# Patient Record
Sex: MALE | Race: WHITE | HISPANIC OR LATINO | Employment: PART TIME | URBAN - METROPOLITAN AREA
[De-identification: names, ages, dates, MRNs, and addresses within clinical notes are randomized per-mention and may not be internally consistent; named-entity substitution may affect disease eponyms.]

---

## 2017-12-18 ENCOUNTER — HOSPITAL ENCOUNTER (EMERGENCY)
Facility: MEDICAL CENTER | Age: 27
End: 2017-12-18
Attending: EMERGENCY MEDICINE

## 2017-12-18 VITALS
TEMPERATURE: 97.7 F | DIASTOLIC BLOOD PRESSURE: 62 MMHG | BODY MASS INDEX: 41.83 KG/M2 | HEART RATE: 72 BPM | WEIGHT: 276.02 LBS | HEIGHT: 68 IN | SYSTOLIC BLOOD PRESSURE: 121 MMHG | OXYGEN SATURATION: 95 % | RESPIRATION RATE: 16 BRPM

## 2017-12-18 DIAGNOSIS — I97.621 POSTOPERATIVE HEMATOMA INVOLVING CIRCULATORY SYSTEM FOLLOWING NON-CIRCULATORY SYSTEM PROCEDURE: ICD-10-CM

## 2017-12-18 LAB
ALBUMIN SERPL BCP-MCNC: 2.8 G/DL (ref 3.2–4.9)
ALBUMIN/GLOB SERPL: 1 G/DL
ALP SERPL-CCNC: 54 U/L (ref 30–99)
ALT SERPL-CCNC: 19 U/L (ref 2–50)
ANION GAP SERPL CALC-SCNC: 7 MMOL/L (ref 0–11.9)
AST SERPL-CCNC: 14 U/L (ref 12–45)
BASOPHILS # BLD AUTO: 0.5 % (ref 0–1.8)
BASOPHILS # BLD: 0.06 K/UL (ref 0–0.12)
BILIRUB SERPL-MCNC: 0.4 MG/DL (ref 0.1–1.5)
BUN SERPL-MCNC: 21 MG/DL (ref 8–22)
CALCIUM SERPL-MCNC: 8.2 MG/DL (ref 8.5–10.5)
CHLORIDE SERPL-SCNC: 104 MMOL/L (ref 96–112)
CO2 SERPL-SCNC: 23 MMOL/L (ref 20–33)
CREAT SERPL-MCNC: 1.17 MG/DL (ref 0.5–1.4)
EOSINOPHIL # BLD AUTO: 0.12 K/UL (ref 0–0.51)
EOSINOPHIL NFR BLD: 1 % (ref 0–6.9)
ERYTHROCYTE [DISTWIDTH] IN BLOOD BY AUTOMATED COUNT: 46.1 FL (ref 35.9–50)
GFR SERPL CREATININE-BSD FRML MDRD: >60 ML/MIN/1.73 M 2
GLOBULIN SER CALC-MCNC: 2.8 G/DL (ref 1.9–3.5)
GLUCOSE SERPL-MCNC: 99 MG/DL (ref 65–99)
HCT VFR BLD AUTO: 31.6 % (ref 42–52)
HGB BLD-MCNC: 10.4 G/DL (ref 14–18)
IMM GRANULOCYTES # BLD AUTO: 0.12 K/UL (ref 0–0.11)
IMM GRANULOCYTES NFR BLD AUTO: 1 % (ref 0–0.9)
LYMPHOCYTES # BLD AUTO: 2.3 K/UL (ref 1–4.8)
LYMPHOCYTES NFR BLD: 18.6 % (ref 22–41)
MCH RBC QN AUTO: 28.2 PG (ref 27–33)
MCHC RBC AUTO-ENTMCNC: 32.9 G/DL (ref 33.7–35.3)
MCV RBC AUTO: 85.6 FL (ref 81.4–97.8)
MONOCYTES # BLD AUTO: 1.16 K/UL (ref 0–0.85)
MONOCYTES NFR BLD AUTO: 9.4 % (ref 0–13.4)
NEUTROPHILS # BLD AUTO: 8.59 K/UL (ref 1.82–7.42)
NEUTROPHILS NFR BLD: 69.5 % (ref 44–72)
NRBC # BLD AUTO: 0 K/UL
NRBC BLD AUTO-RTO: 0 /100 WBC
PLATELET # BLD AUTO: 316 K/UL (ref 164–446)
PMV BLD AUTO: 10.3 FL (ref 9–12.9)
POTASSIUM SERPL-SCNC: 4.3 MMOL/L (ref 3.6–5.5)
PROT SERPL-MCNC: 5.6 G/DL (ref 6–8.2)
RBC # BLD AUTO: 3.69 M/UL (ref 4.7–6.1)
SODIUM SERPL-SCNC: 134 MMOL/L (ref 135–145)
WBC # BLD AUTO: 12.4 K/UL (ref 4.8–10.8)

## 2017-12-18 PROCEDURE — 80053 COMPREHEN METABOLIC PANEL: CPT

## 2017-12-18 PROCEDURE — 85025 COMPLETE CBC W/AUTO DIFF WBC: CPT

## 2017-12-18 PROCEDURE — 99285 EMERGENCY DEPT VISIT HI MDM: CPT

## 2017-12-18 PROCEDURE — 700111 HCHG RX REV CODE 636 W/ 250 OVERRIDE (IP): Performed by: EMERGENCY MEDICINE

## 2017-12-18 PROCEDURE — 96375 TX/PRO/DX INJ NEW DRUG ADDON: CPT

## 2017-12-18 PROCEDURE — 96374 THER/PROPH/DIAG INJ IV PUSH: CPT

## 2017-12-18 RX ORDER — MORPHINE SULFATE 4 MG/ML
4 INJECTION, SOLUTION INTRAMUSCULAR; INTRAVENOUS ONCE
Status: COMPLETED | OUTPATIENT
Start: 2017-12-18 | End: 2017-12-18

## 2017-12-18 RX ORDER — ONDANSETRON 4 MG/1
4 TABLET, ORALLY DISINTEGRATING ORAL ONCE
Status: COMPLETED | OUTPATIENT
Start: 2017-12-18 | End: 2017-12-18

## 2017-12-18 RX ORDER — KETOROLAC TROMETHAMINE 30 MG/ML
30 INJECTION, SOLUTION INTRAMUSCULAR; INTRAVENOUS ONCE
Status: COMPLETED | OUTPATIENT
Start: 2017-12-18 | End: 2017-12-18

## 2017-12-18 RX ADMIN — MORPHINE SULFATE 4 MG: 4 INJECTION INTRAVENOUS at 05:21

## 2017-12-18 RX ADMIN — ONDANSETRON 4 MG: 4 TABLET, ORALLY DISINTEGRATING ORAL at 05:21

## 2017-12-18 RX ADMIN — KETOROLAC TROMETHAMINE 30 MG: 30 INJECTION, SOLUTION INTRAMUSCULAR at 05:26

## 2017-12-18 ASSESSMENT — LIFESTYLE VARIABLES
EVER HAD A DRINK FIRST THING IN THE MORNING TO STEADY YOUR NERVES TO GET RID OF A HANGOVER: NO
CONSUMPTION TOTAL: INCOMPLETE
DO YOU DRINK ALCOHOL: YES
TOTAL SCORE: 0
TOTAL SCORE: 0
HAVE PEOPLE ANNOYED YOU BY CRITICIZING YOUR DRINKING: NO
TOTAL SCORE: 0
HAVE YOU EVER FELT YOU SHOULD CUT DOWN ON YOUR DRINKING: NO
EVER FELT BAD OR GUILTY ABOUT YOUR DRINKING: NO

## 2017-12-18 ASSESSMENT — PAIN SCALES - GENERAL
PAINLEVEL_OUTOF10: 5
PAINLEVEL_OUTOF10: 5
PAINLEVEL_OUTOF10: 4

## 2017-12-18 NOTE — ED PROVIDER NOTES
"ED Provider Note    CHIEF COMPLAINT  Chief Complaint   Patient presents with   • Abdominal Swelling     Pt. states he had liposuction two weeks ago in [facility]. Pt. states that his drainage tubes from the surgery fell out two days ago. Pt. states they weren't supposed to be checked until this week for possible removal. Pt. states increasing LLQ swelling. Pt. states 5/10 LLQ and left sided groin pain.         HPI  Floyd Avendaño is a 27 y.o. male who presentsWith abdominal swelling. Patient had a tummy tuck 2 weeks ago in [facility]. He had ADOLFO drains in place. They were supposed to be removed in 3 days at a follow-up appointment. However they came out and he has had increased swelling primarily over the left lateral side of his lower abdominal wound. He denies having a fever. He has throbbing discomfort that is worse when he lifts his leg up or bends over. He has not noticed a skin rash. The wound appears to be healing well.    REVIEW OF SYSTEMS  As per HPI  All other systems are negative.     PAST MEDICAL HISTORY  Past Medical History:   Diagnosis Date   • Anxiety        FAMILY HISTORY  History reviewed. No pertinent family history.    SOCIAL HISTORY  Social History   Substance Use Topics   • Smoking status: Current Every Day Smoker   • Smokeless tobacco: Never Used   • Alcohol use Yes      Comment: occasssioanlly       SURGICAL HISTORY  Tummy tuck    CURRENT MEDICATIONS  Home Medications     Reviewed by Genna Mejia R.N. (Registered Nurse) on 12/18/17 at 6106  Med List Status: Not Addressed   Medication Last Dose Status   lorazepam (ATIVAN) 1 MG TABS  Active                ALLERGIES  Allergies   Allergen Reactions   • Pcn [Penicillins]        PHYSICAL EXAM  VITAL SIGNS: /62   Pulse 74   Temp 36.5 °C (97.7 °F)   Resp 16   Ht 1.727 m (5' 8\")   Wt (!) 125.2 kg (276 lb 0.3 oz)   SpO2 95%   BMI 41.97 kg/m²   Constitutional: Awake and alert, Mildly pale appearing  HENT: Normal inspection  Eyes: " Sclera white  Neck: Normal range of motion  Cardiovascular: Normal heart rate, Normal rhythm  Thorax & Lungs: Normal breath sounds, No respiratory distress, No wheezing, No chest tenderness.   Abdomen:Surgical wound evident horizontally across the lower abdomen. Staples are in place. There is no surrounding redness or warmth. There is no adjacent erythema. No discharge. Circular wound at the umbilicus noted. There does appear to be slight increased fullness over the left lateral side of the wound versus the right. There is no excessive warmth or erythema. He does have diffuse tenderness.  Skin: As described above  Extremities: Intact, symmetric distal pulses, no edema.  Neurologic: Grossly normal    Labs:   Results for orders placed or performed during the hospital encounter of 12/18/17   CBC WITH DIFFERENTIAL   Result Value Ref Range    WBC 12.4 (H) 4.8 - 10.8 K/uL    RBC 3.69 (L) 4.70 - 6.10 M/uL    Hemoglobin 10.4 (L) 14.0 - 18.0 g/dL    Hematocrit 31.6 (L) 42.0 - 52.0 %    MCV 85.6 81.4 - 97.8 fL    MCH 28.2 27.0 - 33.0 pg    MCHC 32.9 (L) 33.7 - 35.3 g/dL    RDW 46.1 35.9 - 50.0 fL    Platelet Count 316 164 - 446 K/uL    MPV 10.3 9.0 - 12.9 fL    Neutrophils-Polys 69.50 44.00 - 72.00 %    Lymphocytes 18.60 (L) 22.00 - 41.00 %    Monocytes 9.40 0.00 - 13.40 %    Eosinophils 1.00 0.00 - 6.90 %    Basophils 0.50 0.00 - 1.80 %    Immature Granulocytes 1.00 (H) 0.00 - 0.90 %    Nucleated RBC 0.00 /100 WBC    Neutrophils (Absolute) 8.59 (H) 1.82 - 7.42 K/uL    Lymphs (Absolute) 2.30 1.00 - 4.80 K/uL    Monos (Absolute) 1.16 (H) 0.00 - 0.85 K/uL    Eos (Absolute) 0.12 0.00 - 0.51 K/uL    Baso (Absolute) 0.06 0.00 - 0.12 K/uL    Immature Granulocytes (abs) 0.12 (H) 0.00 - 0.11 K/uL    NRBC (Absolute) 0.00 K/uL   COMP METABOLIC PANEL   Result Value Ref Range    Sodium 134 (L) 135 - 145 mmol/L    Potassium 4.3 3.6 - 5.5 mmol/L    Chloride 104 96 - 112 mmol/L    Co2 23 20 - 33 mmol/L    Anion Gap 7.0 0.0 - 11.9    Glucose  99 65 - 99 mg/dL    Bun 21 8 - 22 mg/dL    Creatinine 1.17 0.50 - 1.40 mg/dL    Calcium 8.2 (L) 8.5 - 10.5 mg/dL    AST(SGOT) 14 12 - 45 U/L    ALT(SGPT) 19 2 - 50 U/L    Alkaline Phosphatase 54 30 - 99 U/L    Total Bilirubin 0.4 0.1 - 1.5 mg/dL    Albumin 2.8 (L) 3.2 - 4.9 g/dL    Total Protein 5.6 (L) 6.0 - 8.2 g/dL    Globulin 2.8 1.9 - 3.5 g/dL    A-G Ratio 1.0 g/dL   ESTIMATED GFR   Result Value Ref Range    GFR If African American >60 >60 mL/min/1.73 m 2    GFR If Non African American >60 >60 mL/min/1.73 m 2         COURSE & MEDICAL DECISION MAKING  Patient presents with an apparent postoperative hematoma. His wounds appeared to be without infection. He does appear to have fluid collection. I obtained laboratory data. Patient was treated with morphine and Zofran. Pain is controlled. Patient has mild anemia and mildly elevated WBC count of unclear significance. He is afebrile.    Consulted Dr. Christine. Discussed the case. He advised an abdominal binder. This was ordered.     The patient states he can see his surgeon tomorrow. Seems reasonable to allow the surgeon to consider aspiration if felt to be needed. Patient would need further evaluation if he develops any fever, skin rash or other concerning symptoms. He should return for these. He has pain medication at home.    FINAL IMPRESSION  1. Postoperative hematoma        This dictation was created using voice recognition software. The accuracy of the dictation is limited to the abilities of the software.  The nursing notes were reviewed and certain aspects of this information were incorporated into this note.    Electronically signed by: Geovani Castaneda, 12/18/2017 6:15 AM

## 2017-12-18 NOTE — ED NOTES
Assumed care of patient. Patient complains of LLQ abdominal pain with swelling post liposuction two weeks ago in [place]. Patient had drains that fell out two days ago.  Patient alert and oriented x 4. Patient denies any other complaints at this time. Patient side rails up x 2 and call bell within reach.

## 2017-12-18 NOTE — ED NOTES
Pt discharge home. Pt given discharge instructions. Pt verbalized understanding, all questions answered ,vss upon d/c. Pt steady on feet upon discharge

## 2017-12-18 NOTE — ED NOTES
"Floyd Avendaño  27 y.o  Chief Complaint   Patient presents with   • Abdominal Swelling     Pt. states he had liposuction two weeks ago in [facility]. Pt. states that his drainage tubes from the surgery fell out two days ago. Pt. states they weren't supposed to be checked until this week for possible removal. Pt. states increasing LLQ swelling. Pt. states 5/10 LLQ and left sided groin pain.       ./62   Pulse 83   Temp 36.5 °C (97.7 °F)   Resp 16   Ht 1.727 m (5' 8\")   Wt (!) 125.2 kg (276 lb 0.3 oz)   SpO2 98%   BMI 41.97 kg/m²   Pt. States he had a doctor's appt.in [facility] This week but that the pain was too much to bear. Pt. Returned to ER [facility] and educated to inform triage RN of any new or worsening symptoms.  "

## 2017-12-18 NOTE — ED NOTES
Patient's sats decreased to 86% while patient was sleeping and placed on 3 liters via nasal cannula and sats increased to 94%. Patient claims that his pain is better. Patient's results are back and chart up for reevaluation by ERP.

## 2017-12-18 NOTE — ED NOTES
Called patient supply for abdominal binder per charge nurse and they don't have them and traction called and will come place abdominal binder.

## 2017-12-18 NOTE — PROGRESS NOTES
Abdominal binder was delivered to patient.  If any further assistance needed, please call extension [phone] or place order for Ortho Technician assistance as a communication order in [employer].

## 2018-09-15 ENCOUNTER — HOSPITAL ENCOUNTER (EMERGENCY)
Facility: MEDICAL CENTER | Age: 28
End: 2018-09-15
Attending: EMERGENCY MEDICINE

## 2018-09-15 VITALS
RESPIRATION RATE: 16 BRPM | WEIGHT: 282.41 LBS | HEART RATE: 72 BPM | DIASTOLIC BLOOD PRESSURE: 75 MMHG | SYSTOLIC BLOOD PRESSURE: 118 MMHG | TEMPERATURE: 98.4 F | BODY MASS INDEX: 41.83 KG/M2 | HEIGHT: 69 IN | OXYGEN SATURATION: 93 %

## 2018-09-15 DIAGNOSIS — K64.9 HEMORRHOIDS, UNSPECIFIED HEMORRHOID TYPE: ICD-10-CM

## 2018-09-15 PROCEDURE — 99283 EMERGENCY DEPT VISIT LOW MDM: CPT

## 2018-09-15 RX ORDER — HYDROCORTISONE ACETATE 25 MG/1
25 SUPPOSITORY RECTAL EVERY 12 HOURS
Qty: 14 SUPPOSITORY | Refills: 0 | Status: SHIPPED | OUTPATIENT
Start: 2018-09-15 | End: 2018-09-22

## 2018-09-15 ASSESSMENT — PAIN SCALES - GENERAL: PAINLEVEL_OUTOF10: 7

## 2018-09-15 NOTE — ED PROVIDER NOTES
ED Provider Note    CHIEF COMPLAINT  Chief Complaint   Patient presents with   • Rectal Pain     feels like there are bumps   h as rectal bleeding last week        HPI  Floyd Avendaño is a 27 y.o. male who presents complaining of rectal pain and itching for 3 days.  He states that he noticed that he had anal itching and pain the other day when he is intoxicated and felt his bottom.  The pain is dull in nature, increased with wiping his bottom and decreases with an ointment that his aunt gave him.  He states he has had slight bleeding when he wipes his bottom but no blood in the stool.  Denies anal sex or instrumentation.    REVIEW OF SYSTEMS  Pertinent positives include anal pain, blood on the toilet paper  Pertinent negatives include denies family history of pinworms, anal sex or anal instrumentation, hard bowel movement, stool.  PAST MEDICAL HISTORY  Past Medical History:   Diagnosis Date   • Anxiety        FAMILY HISTORY  History reviewed. No pertinent family history.    SOCIAL HISTORY  Social History     Social History   • Marital status: Single     Spouse name: N/A   • Number of children: N/A   • Years of education: N/A     Social History Main Topics   • Smoking status: Current Every Day Smoker   • Smokeless tobacco: Never Used   • Alcohol use Yes      Comment: occasssioanlly   • Drug use: Yes     Types: Inhaled      Comment: Former crystal meth user - Quit 2014   • Sexual activity: Not on file     Other Topics Concern   • Not on file     Social History Narrative    ** Merged History Encounter **            SURGICAL HISTORY  Past Surgical History:   Procedure Laterality Date   • LIPOSUCTION         CURRENT MEDICATIONS  Home Medications     Reviewed by July Castro (Pharmacy Tech) on 09/15/18 at 1401  Med List Status: Complete   Medication Last Dose Status        Patient Michael Taking any Medications                       ALLERGIES  Allergies   Allergen Reactions   • Pcn [Penicillins]        PHYSICAL  "EXAM  VITAL SIGNS: /93   Pulse 65   Temp 36.9 °C (98.4 °F)   Resp 16   Ht 1.753 m (5' 9\")   Wt (!) 128.1 kg (282 lb 6.6 oz)   SpO2 98%   BMI 41.70 kg/m²    Constitutional: Well developed, Well nourished, No acute distress, Non-toxic appearance.   Eyes: PERRLA, EOMI, Conjunctiva normal, No discharge.   Skin: Warm, Dry, No erythema, No rash.   Rectal: The patient has a small hemorrhoid/skin tag at approximately 3:00, there is no thrombosed hemorrhoid, the anus is lathered in a white ointment        COURSE & MEDICAL DECISION MAKING  Pertinent Labs & Imaging studies reviewed. (See chart for details)  This is a 27-year-old male presents with hemorrhoids.  The patient has no evidence of thrombosed hemorrhoid requiring surgical intervention.  He does not appear to have evidence of cancer.  Does have a small skin tag as well as small hemorrhoid.  He is received prescription for Anusol and strict return precautions have been given his to follow-up with GI consultants if he has increasing symptomatology.    New Prescriptions    HYDROCORTISONE (ANUSOL-HC) 25 MG SUPPOS    Insert 1 Suppository in rectum every 12 hours for 7 days.         FINAL IMPRESSION     1. Hemorrhoids, unspecified hemorrhoid type        DISPOSITION:  Patient will be discharged home in stable condition.    FOLLOW UP:  [facility]  [address] [place] [id number] [phone]  Schedule an appointment as soon as possible for a visit in 1 week  As needed      OUTPATIENT MEDICATIONS:  Discharge Medication List as of 9/15/2018  2:18 PM      START taking these medications    Details   hydrocortisone (ANUSOL-HC) 25 MG Suppos Insert 1 Suppository in rectum every 12 hours for 7 days., Disp-14 Suppository, R-0, Print Rx Paper             Electronically signed by: Ketan Child, 9/15/2018 2:16 PM    "

## 2018-09-15 NOTE — ED NOTES
"Denies blood in stool.  Pt states \"I'm so worried.  I felt it there and knew I needed to get to the doctor.\"    "

## 2018-09-15 NOTE — ED NOTES
Ambulatory to room.  Agree with triage assessment.  Changing into gown.  Chart placed for ERP eval.

## 2018-09-15 NOTE — ED NOTES
Pt comes in c/o rectal pain and discomfort that started about 3-4 days ago   Had some bleeding about 9/1/2018 feels a bump there as well

## 2018-09-15 NOTE — ED NOTES
Pt was evaluated by MD and is now cleared for d/c  dischg instructions  Verbally understands  D/c'ed to home in NAD w/ Rx anusol given  Pt will fill and f/u w/ referral MD as needed

## 2018-09-15 NOTE — DISCHARGE INSTRUCTIONS
Hemorrhoids  Hemorrhoids are swollen veins in and around the rectum or anus. There are two types of hemorrhoids:  · Internal hemorrhoids. These occur in the veins that are just inside the rectum. They may poke through to the outside and become irritated and painful.  · External hemorrhoids. These occur in the veins that are outside of the anus and can be felt as a painful swelling or hard lump near the anus.  Most hemorrhoids do not cause serious problems, and they can be managed with home treatments such as diet and lifestyle changes. If home treatments do not help your symptoms, procedures can be done to shrink or remove the hemorrhoids.  What are the causes?  This condition is caused by increased pressure in the anal area. This pressure may result from various things, including:  · Constipation.  · Straining to have a bowel movement.  · Diarrhea.  · Pregnancy.  · Obesity.  · Sitting for long periods of time.  · Heavy lifting or other activity that causes you to strain.  · Anal sex.  What are the signs or symptoms?  Symptoms of this condition include:  · Pain.  · Anal itching or irritation.  · Rectal bleeding.  · Leakage of stool (feces).  · Anal swelling.  · One or more lumps around the anus.  How is this diagnosed?  This condition can often be diagnosed through a visual exam. Other exams or tests may also be done, such as:  · Examination of the rectal area with a gloved hand (digital rectal exam).  · Examination of the anal canal using a small tube (anoscope).  · A blood test, if you have lost a significant amount of blood.  · A test to look inside the colon (sigmoidoscopy or colonoscopy).  How is this treated?  This condition can usually be treated at home. However, various procedures may be done if dietary changes, lifestyle changes, and other home treatments do not help your symptoms. These procedures can help make the hemorrhoids smaller or remove them completely. Some of these procedures involve surgery,  and others do not. Common procedures include:  · Rubber band ligation. Rubber bands are placed at the base of the hemorrhoids to cut off the blood supply to them.  · Sclerotherapy. Medicine is injected into the hemorrhoids to shrink them.  · Infrared coagulation. A type of light energy is used to get rid of the hemorrhoids.  · Hemorrhoidectomy surgery. The hemorrhoids are surgically removed, and the veins that supply them are tied off.  · Stapled hemorrhoidopexy surgery. A circular stapling device is used to remove the hemorrhoids and use staples to cut off the blood supply to them.  Follow these instructions at home:  Eating and drinking  · Eat foods that have a lot of fiber in them, such as whole grains, beans, nuts, fruits, and vegetables. Ask your health care provider about taking products that have added fiber (fiber supplements).  · Drink enough fluid to keep your urine clear or pale yellow.  Managing pain and swelling  · Take warm sitz baths for 20 minutes, 3-4 times a day to ease pain and discomfort.  · If directed, apply ice to the affected area. Using ice packs between sitz baths may be helpful.  ¨ Put ice in a plastic bag.  ¨ Place a towel between your skin and the bag.  ¨ Leave the ice on for 20 minutes, 2-3 times a day.  General instructions  · Take over-the-counter and prescription medicines only as told by your health care provider.  · Use medicated creams or suppositories as told.  · Exercise regularly.  · Go to the bathroom when you have the urge to have a bowel movement. Do not wait.  · Avoid straining to have bowel movements.  · Keep the anal area dry and clean. Use wet toilet paper or moist towelettes after a bowel movement.  · Do not sit on the toilet for long periods of time. This increases blood pooling and pain.  Contact a health care provider if:  · You have increasing pain and swelling that are not controlled by treatment or medicine.  · You have uncontrolled bleeding.  · You have  difficulty having a bowel movement, or you are unable to have a bowel movement.  · You have pain or inflammation outside the area of the hemorrhoids.  This information is not intended to replace advice given to you by your health care provider. Make sure you discuss any questions you have with your health care provider.  Document Released: 12/15/2001 Document Revised: 05/17/2017 Document Reviewed: 08/31/2016  Elsevier Interactive Patient Education © 2017 [employer] Inc[employer]

## 2019-05-10 ENCOUNTER — HOSPITAL ENCOUNTER (EMERGENCY)
Facility: MEDICAL CENTER | Age: 29
End: 2019-05-10
Attending: EMERGENCY MEDICINE

## 2019-05-10 VITALS
DIASTOLIC BLOOD PRESSURE: 66 MMHG | RESPIRATION RATE: 16 BRPM | OXYGEN SATURATION: 97 % | WEIGHT: 228.62 LBS | BODY MASS INDEX: 35.88 KG/M2 | SYSTOLIC BLOOD PRESSURE: 125 MMHG | HEART RATE: 46 BPM | HEIGHT: 67 IN | TEMPERATURE: 97.8 F

## 2019-05-10 DIAGNOSIS — R51.9 ACUTE NONINTRACTABLE HEADACHE, UNSPECIFIED HEADACHE TYPE: ICD-10-CM

## 2019-05-10 PROCEDURE — 700111 HCHG RX REV CODE 636 W/ 250 OVERRIDE (IP): Performed by: EMERGENCY MEDICINE

## 2019-05-10 PROCEDURE — 96372 THER/PROPH/DIAG INJ SC/IM: CPT

## 2019-05-10 PROCEDURE — 99283 EMERGENCY DEPT VISIT LOW MDM: CPT

## 2019-05-10 RX ORDER — KETOROLAC TROMETHAMINE 30 MG/ML
30 INJECTION, SOLUTION INTRAMUSCULAR; INTRAVENOUS ONCE
Status: COMPLETED | OUTPATIENT
Start: 2019-05-10 | End: 2019-05-10

## 2019-05-10 RX ADMIN — KETOROLAC TROMETHAMINE 30 MG: 30 INJECTION, SOLUTION INTRAMUSCULAR at 21:31

## 2019-05-11 NOTE — ED PROVIDER NOTES
ED Provider Note    Scribed for Aditi Osborne M.D. by Arturo Cantor. 5/10/2019  8:46 PM    Means of arrival: Walk In  History of obtained from: Patient  History limited by: None    CHIEF COMPLAINT  Chief Complaint   Patient presents with   • Migraine     Past two days, has been taking excedrine without relief.        LOGAN Avendaño is a 28 y.o. Male with a history of migraines who presents to the Emergency Department for evaluation of an intermittent headache onset 4 days ago. The patient notes that he was sitting at home doing nothing when his headache started. He describes it as feeling like his previous migraines but states that it feels like a severe stabbing pain in the right side of his head, however, he is worried because none of his previous migraines have lasted this long. Per patient, he has tried taking Excedrin for the pain for 3 days which has not provided any alleviation. He states that he regularly drinks 5 hour energy drinks every morning but there has not been a significant change in his caffeine usage, however, he expresses interest in quitting. He denies any recent trauma, alcohol use, and drug use. He also denies experiencing nausea, vomiting, vision changes, numbness, tingling, fever, congestion, coughing, and neck pain.     REVIEW OF SYSTEMS  Pertinent positives include headache. Pertinent negative include no nausea, vomiting, vision changes, numbness, tingling, fever, congestion, coughing, and neck pain.    PAST MEDICAL HISTORY   has a past medical history of Anxiety.    SOCIAL HISTORY  Social History     Social History Main Topics   • Smoking status: Former Smoker   • Smokeless tobacco: Never Used   • Alcohol use No      Comment: occasssioanlly   • Drug use: Yes     Types: Inhaled      Comment: Former crystal meth user - Quit 2014   • Sexual activity: None noted.        SURGICAL HISTORY   has a past surgical history that includes liposuction.    CURRENT MEDICATIONS  Home  "Medications    **Home medications have not yet been reviewed for this encounter**         ALLERGIES  Allergies   Allergen Reactions   • Pcn [Penicillins]        PHYSICAL EXAM  VITAL SIGNS: /74   Pulse (!) 50   Temp 36.2 °C (97.1 °F) (Temporal)   Resp 18   Ht 1.702 m (5' 7\")   Wt 103.7 kg (228 lb 9.9 oz)   SpO2 96%   BMI 35.81 kg/m²      Constitutional: Pleasant well appearing male, reading .  Alert in no apparent distress.  HENT: Normocephalic, Atraumatic. Bilateral external ears normal. Nose normal. Moist mucous membranes.  Neck: Supple, full range of motion.  Eyes: Pupils are equal and reactive. Conjunctiva normal.   Heart: Regular rate and rhythm. No murmurs.    Lungs: No respiratory distress.  Normal work of breathing.  Clear to auscultation bilaterally.  Skin: Warm, Dry. No rash.   Musculoskeletal: Atraumatic, no deformities noted.   Neurologic: Alert and oriented. Moving all extremities spontaneously. Alert and oriented x3.  Cranial nerves II-XII intact.  Strength 5/5 and sensation inact throughout all 4 extremities.  No pronator drift.  No dysmetria.  Normal speech. Normal gait.  Psychiatric: Affect normal, Mood normal. Appears appropriate and not intoxicated.     ED COURSE  Vitals:    05/10/19 1901 05/10/19 1927 05/10/19 2124 05/10/19 2158   BP: 124/74  125/66    Pulse: (!) 50  (!) 48 (!) 46   Resp: 18  16    Temp: 36.2 °C (97.1 °F)  36.6 °C (97.8 °F)    TempSrc: Temporal  Temporal    SpO2: 96%  96% 97%   Weight:  103.7 kg (228 lb 9.9 oz)     Height: 1.702 m (5' 7\")        Medications administered:  Medications   ketorolac (TORADOL) injection 30 mg (30 mg Intramuscular Given 5/10/19 2131)       MEDICAL DECISION MAKING  8:46 PM Patient seen and examined at bedside. The patient presents with a headache.  He is afebrile with normal vitals on arrival and well-appearing.  He has no deficits on exam concerning for intracranial hemorrhage or mass.  History and exam are not concerning for subarachnoid " hemorrhage or meningitis.  Patient will be treated with Toradol 30 mg for his symptoms. I informed the patient that we will give him medications here in the ED for his symptoms and he will be discharged after. I recommended that he taper off of the caffeine if he wants to stop instead of quitting immediately. I also told him to stay as hydrated as possible and get plenty of rest for alleviation of his symptoms. He understood and agreed to the plan of care including discharge.       The patient will return for new or worsening symptoms and is stable at the time of discharge.    The patient is referred to a primary physician for blood pressure management, diabetic screening, and for all other preventative health concerns.    DISPOSITION:  Patient will be discharged home in stable condition.    FOLLOW UP:  [facility]  [address] [address] [phone]  Call   to establish PCP    [facility], Emergency Dept  [address] [address] [phone]    If symptoms worsen      IMPRESSION  (R51) Acute nonintractable headache, unspecified headache type    Results, diagnoses, and treatment options were discussed with the patient and/or family. Patient verbalized understanding of plan of care and strict return precautions prior to discharge.     Arturo PURVIS ([employer]), am scribing for, and in the presence of, Aditi Osborne M.D..    Electronically signed by: Arturo Cantor ([employer]), 5/10/2019    Aditi PURVIS M.D. personally performed the services described in this documentation, as scribed by Arturo Cantor in my presence, and it is both accurate and complete. E.       The note accurately reflects work and decisions made by me.  Aditi Osborne  5/11/2019  12:07 AM

## 2019-05-11 NOTE — ED TRIAGE NOTES
Floyd Avendaño  28 y.o. male  Chief Complaint   Patient presents with   • Migraine     Past two days, has been taking excedrine without relief.      Denies N/V. Denies visual changes. Denies numbness and tingling. Denies any neuro deficits.    Pt amb to triage with steady gait for above complaint.  Pt is alert and oriented, speaking in full sentences, follows commands and responds appropriately to questions. Resp are even and unlabored. No behavioral indicators of pain.   Pt placed in lobby. Pt educated on triage process. Pt encouraged to alert staff for any changes.

## 2019-05-11 NOTE — DISCHARGE INSTRUCTIONS
You were seen in the Emergency Department for headache.      Please use 1,000mg of tylenol or 600mg of ibuprofen every 6 hours as needed for pain.  Drink plenty of fluids.  Slowly decrease caffeine use.     Please follow up with your primary care physician.    Return to the Emergency Department with worsening headache, vision changes, numbness or weakness in extremities, confusion, vomiting, or other concerns.

## 2019-05-31 ENCOUNTER — PATIENT OUTREACH (OUTPATIENT)
Dept: HEALTH INFORMATION MANAGEMENT | Facility: OTHER | Age: 29
End: 2019-05-31

## 2019-05-31 ENCOUNTER — APPOINTMENT (OUTPATIENT)
Dept: RADIOLOGY | Facility: MEDICAL CENTER | Age: 29
End: 2019-05-31
Attending: EMERGENCY MEDICINE

## 2019-05-31 ENCOUNTER — HOSPITAL ENCOUNTER (EMERGENCY)
Facility: MEDICAL CENTER | Age: 29
End: 2019-05-31
Attending: EMERGENCY MEDICINE

## 2019-05-31 VITALS
HEART RATE: 62 BPM | SYSTOLIC BLOOD PRESSURE: 116 MMHG | WEIGHT: 234.79 LBS | BODY MASS INDEX: 36.85 KG/M2 | OXYGEN SATURATION: 97 % | TEMPERATURE: 98 F | DIASTOLIC BLOOD PRESSURE: 74 MMHG | HEIGHT: 67 IN | RESPIRATION RATE: 16 BRPM

## 2019-05-31 DIAGNOSIS — R51.9 ACUTE NONINTRACTABLE HEADACHE, UNSPECIFIED HEADACHE TYPE: ICD-10-CM

## 2019-05-31 PROCEDURE — 700111 HCHG RX REV CODE 636 W/ 250 OVERRIDE (IP): Performed by: EMERGENCY MEDICINE

## 2019-05-31 PROCEDURE — 96372 THER/PROPH/DIAG INJ SC/IM: CPT

## 2019-05-31 PROCEDURE — 99284 EMERGENCY DEPT VISIT MOD MDM: CPT

## 2019-05-31 PROCEDURE — A9270 NON-COVERED ITEM OR SERVICE: HCPCS | Performed by: EMERGENCY MEDICINE

## 2019-05-31 PROCEDURE — 70450 CT HEAD/BRAIN W/O DYE: CPT

## 2019-05-31 PROCEDURE — 700102 HCHG RX REV CODE 250 W/ 637 OVERRIDE(OP): Performed by: EMERGENCY MEDICINE

## 2019-05-31 RX ORDER — CYCLOBENZAPRINE HCL 5 MG
5-10 TABLET ORAL 3 TIMES DAILY PRN
Qty: 30 TAB | Refills: 0 | Status: SHIPPED | OUTPATIENT
Start: 2019-05-31

## 2019-05-31 RX ORDER — BUTALBITAL, ACETAMINOPHEN AND CAFFEINE 50; 325; 40 MG/1; MG/1; MG/1
1 TABLET ORAL EVERY 4 HOURS PRN
Qty: 20 TAB | Refills: 0 | Status: SHIPPED | OUTPATIENT
Start: 2019-05-31 | End: 2019-06-03

## 2019-05-31 RX ORDER — CYCLOBENZAPRINE HCL 5 MG
5-10 TABLET ORAL 3 TIMES DAILY PRN
Qty: 30 TAB | Refills: 0 | Status: SHIPPED | OUTPATIENT
Start: 2019-05-31 | End: 2019-05-31

## 2019-05-31 RX ORDER — CYCLOBENZAPRINE HCL 10 MG
10 TABLET ORAL ONCE
Status: COMPLETED | OUTPATIENT
Start: 2019-05-31 | End: 2019-05-31

## 2019-05-31 RX ORDER — KETOROLAC TROMETHAMINE 30 MG/ML
30 INJECTION, SOLUTION INTRAMUSCULAR; INTRAVENOUS ONCE
Status: COMPLETED | OUTPATIENT
Start: 2019-05-31 | End: 2019-05-31

## 2019-05-31 RX ADMIN — KETOROLAC TROMETHAMINE 30 MG: 30 INJECTION, SOLUTION INTRAMUSCULAR; INTRAVENOUS at 12:37

## 2019-05-31 RX ADMIN — CYCLOBENZAPRINE HYDROCHLORIDE 10 MG: 10 TABLET, FILM COATED ORAL at 12:37

## 2019-05-31 NOTE — ED TRIAGE NOTES
"Floyd Avendaño  Chief Complaint   Patient presents with   • Migraine     left side,  intermittent over last 2 days     Pt ambulatory to triage with above complaint.  VSS,  Pt states intermittent headache sharp pain that \"moves to different places\", but mainly left eye and top of head.  +photosensitivity.   Was seen here approx 2 weeks ago, given a shot and sent home,  But continues to have Headaches.  Pt worried that he may \"have something more serious going on\"  Pt returned to lobby, educated on triage process, and to inform staff of any changes or concerns.     "

## 2019-05-31 NOTE — ED NOTES
Pt discharged home. Explained discharge and medication instructions. Questions and comments addressed. Pt verbalized understanding of instructions. Pt advised to follow-up with PCP as needed or return to ED for any new or worsening of symptoms. Pt is ambulating well and steady on feet. VS stable.

## 2019-05-31 NOTE — ED PROVIDER NOTES
"ED Provider Note    Scribed for Best Arnold M.D. by Mala Foley. 5/31/2019  11:35 AM    Primary care provider: Pcp Pt States None  Means of arrival: Walk-in  History obtained from: Patient  History limited by: None    CHIEF COMPLAINT  Chief Complaint   Patient presents with   • Migraine     left side,  intermittent over last 2 days       HPI  Floyd Avendaño is a 28 y.o. male who presents to the Emergency Department for evaluation of headache pain onset 2 weeks ago. The patient's pain is intermitted and will last for periods of 1-2 hours before resolving. The patient's pain is primarily located on the right side of his head. The patient described his pain as \"sharp and stabbing\". The patient describes the intensity of his pain as \"severe\". The patient has been taking Tylenol, Excedrin, ibuprofen, and an unknown migraine medication for his symptoms with minimal alleviation. The patient affirms an additional symptom of photophobia. The patient's pain is not exacerbated by moving his head or biting down. The patient is unsure what initiates the start of an episode of pain. The patient was seen at this ED two weeks ago with similar symptoms and he reports he was given a shot that alleviated his headache and discharged home. The patient affirms a prior period of similar symptoms that occurred last August. The patient has never been diagnosed with migraine headaches. The patient does not have a regular doctor. The patient denies any fevers, ear pain, neck pain, rhinorrhea, cough, or congestion. No recent trauma to the head.    REVIEW OF SYSTEMS  Pertinent positives include headache and photophobia. Pertinent negatives include no fevers, ear pain, neck pain, rhinorrhea, cough, or congestion.  All other systems reviewed and negative.    PAST MEDICAL HISTORY   has a past medical history of Anxiety.    SURGICAL HISTORY   has a past surgical history that includes liposuction.    SOCIAL HISTORY  Social History " "  Substance Use Topics   • Smoking status: Former Smoker   • Smokeless tobacco: Never Used   • Alcohol use No      Comment: occasssioanlly      History   Drug Use   • Types: Inhaled     Comment: Former crystal meth user - Quit 2014       CURRENT MEDICATIONS  Home Medications     Reviewed by Annmarie Smart R.N. (Registered Nurse) on 05/31/19 at 1112  Med List Status: Complete   Medication Last Dose Status        Patient Michael Taking any Medications                       ALLERGIES  Allergies   Allergen Reactions   • Pcn [Penicillins]        PHYSICAL EXAM  VITAL SIGNS: /66   Pulse (!) 51   Temp 36.8 °C (98.2 °F) (Temporal)   Ht 1.702 m (5' 7\")   Wt 106.5 kg (234 lb 12.6 oz)   SpO2 96%   BMI 36.77 kg/m²     Constitutional: Well developed, Well nourished, mild distress, Non-toxic appearance.   HENT: Normocephalic, Atraumatic, Bilateral external ears normal, Oropharynx moist, No oral exudates. Poor dentition on the right lower jaw. No soft tissue swelling.  TM's clear. No temporal artery tenderness. Slight right TMJ tenderness.  Eyes: PERRLA, EOMI, Conjunctiva normal, No discharge.   Neck: No tenderness, Supple, No stridor. Tenderness on the right paraspinal musculature.   Lymphatic: No lymphadenopathy noted.   Cardiovascular: Normal heart rate, Normal rhythm.   Thorax & Lungs: No respiratory distress  Abdomen: Soft, No tenderness, No masses, No pulsatile masses.   Skin: Warm, Dry, No erythema, No rash.   Extremities:, No edema No cyanosis.   Musculoskeletal: Awake, alert. Cranial nerves 2-11 intact, muscle strength 5/5 in bilateral upper and lower extremities  Neurologic: Awake, alert. Moves all extremities spontaneously.  Psychiatric: Affect normal, Judgment normal, Mood normal.     RADIOLOGY  CT-HEAD W/O   Final Result      No acute intracranial abnormality is identified.        The radiologist's interpretation of all radiological studies have been reviewed by me.      COURSE & MEDICAL DECISION " MAKING  Pertinent Labs & Imaging studies reviewed. (See chart for details)    11:35 AM - Patient seen and examined at bedside. I informed the patient of my plan to CT his head because the patient has never had radiology imaging to evaluate his symptoms. I also informed the patient it is possible he is having a tension headache so I will treat with a muscle relaxer to relieve his paraspinal neck tenderness. The patient noted the Toradol injection he received on his last visit did help his pain so I informed him I will order that for him today. Patient verbalizes understanding and support with my plan of care. Patient will be treated with 30 mg Toradol and 10 mg Flexeril. Ordered CT-Head without to evaluate his symptoms.     1:10 PM - I reevaluated the patient at bedside. The patient informs me they feel improved following medication administration. I discussed the patient's radiology study results which show no acute intracranial abnormality. The patient agrees to establish with a primary care physician. The patient verbalizes they feel comfortable going home. The patient is stable for discharge at this time and will return for any new or worsening symptoms. I discussed plan for discharge and follow up as outlined below. Patient verbalizes understanding and support with my plan for discharge.     Decision Making:  Patient with sharp intermittent headaches on the right side of his head, I do not see any evidence of temporal arthritis, could possibly be some TMJ versus tension headache, do the patient's chronic irregularity with his headaches, does not fit a typical migraine component, multiple visits to the ER I did elect patient has no other     The patient will return for new or worsening symptoms and is stable at the time of discharge.    The patient is referred to a primary physician for blood pressure management, diabetic screening, and for all other preventative health concerns.    DISPOSITION:  Patient will be  discharged home in stable condition.    FOLLOW UP:  [address] [id number] [phone]    Call to schedule an appointment to establish with a primary care provider. This office offers discounts to patients who do not have insurance.     [address] [id number] [phone]          OUTPATIENT MEDICATIONS:  New Prescriptions    ACETAMINOPHEN/CAFFEINE/BUTALBITAL 325-40-50 MG (FIORICET) -40 MG TAB    Take 1 Tab by mouth every four hours as needed for Headache for up to 3 days.    CYCLOBENZAPRINE (FLEXERIL) 5 MG TABLET    Take 1-2 Tabs by mouth 3 times a day as needed.         FINAL IMPRESSION  1. Acute nonintractable headache, unspecified headache type          I, Mala Foley (Scribe), am scribing for, and in the presence of, Best Arnold M.D..    Electronically signed by: Mala Foley (Keyanaibe), 5/31/2019    IBest M.D. personally performed the services described in this documentation, as scribed by Mala Foley in my presence, and it is both accurate and complete.    The note accurately reflects work and decisions made by me.  Best Arnold  5/31/2019  1:28 PM

## 2022-08-14 ENCOUNTER — HOSPITAL ENCOUNTER (EMERGENCY)
Facility: MEDICAL CENTER | Age: 32
End: 2022-08-15
Attending: EMERGENCY MEDICINE

## 2022-08-14 DIAGNOSIS — R10.32 LEFT LOWER QUADRANT ABDOMINAL PAIN: ICD-10-CM

## 2022-08-14 DIAGNOSIS — D72.829 LEUKOCYTOSIS, UNSPECIFIED TYPE: ICD-10-CM

## 2022-08-14 DIAGNOSIS — K57.92 DIVERTICULITIS: Primary | ICD-10-CM

## 2022-08-14 LAB
ALBUMIN SERPL BCP-MCNC: 4.4 G/DL (ref 3.2–4.9)
ALBUMIN/GLOB SERPL: 1.6 G/DL
ALP SERPL-CCNC: 74 U/L (ref 30–99)
ALT SERPL-CCNC: 36 U/L (ref 2–50)
ANION GAP SERPL CALC-SCNC: 13 MMOL/L (ref 7–16)
AST SERPL-CCNC: 28 U/L (ref 12–45)
BASOPHILS # BLD AUTO: 0.6 % (ref 0–1.8)
BASOPHILS # BLD: 0.08 K/UL (ref 0–0.12)
BILIRUB SERPL-MCNC: 0.4 MG/DL (ref 0.1–1.5)
BUN SERPL-MCNC: 21 MG/DL (ref 8–22)
CALCIUM SERPL-MCNC: 9.1 MG/DL (ref 8.5–10.5)
CHLORIDE SERPL-SCNC: 104 MMOL/L (ref 96–112)
CO2 SERPL-SCNC: 21 MMOL/L (ref 20–33)
CREAT SERPL-MCNC: 1 MG/DL (ref 0.5–1.4)
EOSINOPHIL # BLD AUTO: 0.09 K/UL (ref 0–0.51)
EOSINOPHIL NFR BLD: 0.7 % (ref 0–6.9)
ERYTHROCYTE [DISTWIDTH] IN BLOOD BY AUTOMATED COUNT: 44.4 FL (ref 35.9–50)
GFR SERPLBLD CREATININE-BSD FMLA CKD-EPI: 101 ML/MIN/1.73 M 2
GLOBULIN SER CALC-MCNC: 2.8 G/DL (ref 1.9–3.5)
GLUCOSE SERPL-MCNC: 96 MG/DL (ref 65–99)
HCT VFR BLD AUTO: 43.6 % (ref 42–52)
HGB BLD-MCNC: 14.6 G/DL (ref 14–18)
IMM GRANULOCYTES # BLD AUTO: 0.07 K/UL (ref 0–0.11)
IMM GRANULOCYTES NFR BLD AUTO: 0.5 % (ref 0–0.9)
LIPASE SERPL-CCNC: 27 U/L (ref 11–82)
LYMPHOCYTES # BLD AUTO: 4.09 K/UL (ref 1–4.8)
LYMPHOCYTES NFR BLD: 31 % (ref 22–41)
MCH RBC QN AUTO: 27.2 PG (ref 27–33)
MCHC RBC AUTO-ENTMCNC: 33.5 G/DL (ref 33.7–35.3)
MCV RBC AUTO: 81.2 FL (ref 81.4–97.8)
MONOCYTES # BLD AUTO: 0.96 K/UL (ref 0–0.85)
MONOCYTES NFR BLD AUTO: 7.3 % (ref 0–13.4)
NEUTROPHILS # BLD AUTO: 7.91 K/UL (ref 1.82–7.42)
NEUTROPHILS NFR BLD: 59.9 % (ref 44–72)
NRBC # BLD AUTO: 0 K/UL
NRBC BLD-RTO: 0 /100 WBC
PLATELET # BLD AUTO: 241 K/UL (ref 164–446)
PMV BLD AUTO: 10.8 FL (ref 9–12.9)
POTASSIUM SERPL-SCNC: 4.2 MMOL/L (ref 3.6–5.5)
PROT SERPL-MCNC: 7.2 G/DL (ref 6–8.2)
RBC # BLD AUTO: 5.37 M/UL (ref 4.7–6.1)
SODIUM SERPL-SCNC: 138 MMOL/L (ref 135–145)
WBC # BLD AUTO: 13.2 K/UL (ref 4.8–10.8)

## 2022-08-14 PROCEDURE — 80053 COMPREHEN METABOLIC PANEL: CPT

## 2022-08-14 PROCEDURE — 36415 COLL VENOUS BLD VENIPUNCTURE: CPT

## 2022-08-14 PROCEDURE — 99284 EMERGENCY DEPT VISIT MOD MDM: CPT

## 2022-08-14 PROCEDURE — 83690 ASSAY OF LIPASE: CPT

## 2022-08-14 PROCEDURE — 85025 COMPLETE CBC W/AUTO DIFF WBC: CPT

## 2022-08-14 PROCEDURE — 81003 URINALYSIS AUTO W/O SCOPE: CPT

## 2022-08-15 VITALS
HEIGHT: 67 IN | BODY MASS INDEX: 49.1 KG/M2 | WEIGHT: 312.83 LBS | SYSTOLIC BLOOD PRESSURE: 155 MMHG | DIASTOLIC BLOOD PRESSURE: 96 MMHG | RESPIRATION RATE: 16 BRPM | HEART RATE: 85 BPM | OXYGEN SATURATION: 95 % | TEMPERATURE: 98.2 F

## 2022-08-15 LAB
APPEARANCE UR: CLEAR
BILIRUB UR QL STRIP.AUTO: NEGATIVE
COLOR UR: YELLOW
GLUCOSE UR STRIP.AUTO-MCNC: NEGATIVE MG/DL
KETONES UR STRIP.AUTO-MCNC: NEGATIVE MG/DL
LEUKOCYTE ESTERASE UR QL STRIP.AUTO: NEGATIVE
MICRO URNS: NORMAL
NITRITE UR QL STRIP.AUTO: NEGATIVE
PH UR STRIP.AUTO: 5.5 [PH] (ref 5–8)
PROT UR QL STRIP: NEGATIVE MG/DL
RBC UR QL AUTO: NEGATIVE
SP GR UR STRIP.AUTO: 1.03
UROBILINOGEN UR STRIP.AUTO-MCNC: 0.2 MG/DL

## 2022-08-15 RX ORDER — LEVOFLOXACIN 750 MG/1
750 TABLET, FILM COATED ORAL DAILY
Qty: 10 TABLET | Refills: 0 | Status: SHIPPED | OUTPATIENT
Start: 2022-08-15

## 2022-08-15 RX ORDER — IBUPROFEN 800 MG/1
800 TABLET ORAL EVERY 8 HOURS PRN
Qty: 20 TABLET | Refills: 0 | Status: SHIPPED | OUTPATIENT
Start: 2022-08-15 | End: 2022-08-18

## 2022-08-15 RX ORDER — ACETAMINOPHEN 500 MG
1000 TABLET ORAL EVERY 6 HOURS PRN
Qty: 20 TABLET | Refills: 0 | Status: SHIPPED | OUTPATIENT
Start: 2022-08-15 | End: 2022-08-19

## 2022-08-15 NOTE — DISCHARGE INSTRUCTIONS
As discussed your labs are normal other than a slight elevation in your white blood cell count which indicates possible infection.  This could indicate diverticulitis which is inflammation of little small outpouching of your colon.  I have prescribed you an antibiotics and sent to the pharmacy as well as Tylenol and Motrin.  Please take these as directed.  Follow-up with your PCP as needed.  If you have a fever or worsening pain, return to ED for further evaluation treatment.  Thank you for coming in today.

## 2022-08-15 NOTE — ED TRIAGE NOTES
"Chief Complaint   Patient presents with    Abdominal Pain     Pt reports an intermittent tightness to LLQ.        Pt to triage with steady gait for above complaint.     Pt presents in triage reporting a tightness in LLQ that comes and goes over the past week.  Pt states he had a tummy tuck and used to wear a girdle.  Pt has since stopped and is concerned the tightness has something to do with not wearing it. Pt denies n/v/d/c or dysuria.    Pt back to lobby, educated on triage process and encourage to alert staff of any changes.     BP (!) 162/104   Pulse 88   Temp 36.3 °C (97.3 °F) (Temporal)   Resp 20   Ht 1.702 m (5' 7\")   Wt (!) 142 kg (312 lb 13.3 oz)   SpO2 94%   BMI 49.00 kg/m²      "

## 2022-08-15 NOTE — ED NOTES
Discharge education provided. Discharge paperwork signed by pt. Prescription to be picked up by pt. All questions answered. All belongings with pt. Pt ambulated to lobby unassisted with steady gait.

## 2022-08-15 NOTE — ED PROVIDER NOTES
"ED Provider Note    Scribed for Geronimo Maravilla by Jo Murdock. 8/15/2022  12:05 AM    Primary care provider: Pcp Pt States None  Means of arrival: Walk-in  History obtained from: Patient  History limited by: None    CHIEF COMPLAINT  Chief Complaint   Patient presents with    Abdominal Pain     Pt reports an intermittent tightness to LLQ.      HPI  Floyd Avendaño is a 31 y.o. male who presents to the Emergency Department for left-sided abdominal pain onset approximately 1 week ago. He describes his pain as intermittent \"tightening.\" The patient also notes he has worn a girdle for the past two years and ever since he took it off two months ago, he experiences \"cramping\" in his abdomen. Denies fever, nausea, vomiting, diarrhea, cough, shortness of breath, hematuria, dysuria. Denies medication for diabetes or blood pressure.    Quality \"cramping\"  Duration: Intermittent  Severity: Moderate  Associated sx: None    REVIEW OF SYSTEMS  As above, all other systems reviewed and are negative.   See HPI for further details.     PAST MEDICAL HISTORY   has a past medical history of Anxiety.  SURGICAL HISTORY   has a past surgical history that includes liposuction.  SOCIAL HISTORY  Social History     Tobacco Use    Smoking status: Some Days     Types: Cigarettes    Smokeless tobacco: Never   Vaping Use    Vaping Use: Never used   Substance Use Topics    Alcohol use: Yes     Comment: occasssioanlly    Drug use: Yes     Types: Inhaled     Comment: marijuana- daily.  Former crystal meth user - Quit 2014      Social History     Substance and Sexual Activity   Drug Use Yes    Types: Inhaled    Comment: marijuana- daily.  Former crystal meth user - Quit 2014     FAMILY HISTORY  History reviewed. No pertinent family history.  CURRENT MEDICATIONS  Home Medications       Reviewed by Ernestine Brooks R.N. (Registered Nurse) on 08/14/22 at 2218  Med List Status: Not Addressed     Medication Last Dose Status   cyclobenzaprine " "(FLEXERIL) 5 MG tablet  Active                  ALLERGIES  Allergies   Allergen Reactions    Pcn [Penicillins]      Taken recently with no reaction       PHYSICAL EXAM    VITAL SIGNS:   Vitals:    08/14/22 2212 08/15/22 0026   BP: (!) 162/104 (!) 155/96   Pulse: 88 85   Resp: 20 16   Temp: 36.3 °C (97.3 °F) 36.8 °C (98.2 °F)   TempSrc: Temporal Temporal   SpO2: 94% 95%   Weight: (!) 142 kg (312 lb 13.3 oz)    Height: 1.702 m (5' 7\")        Vitals: My interpretation: hypertensive, not tachycardic, afebrile, not hypoxic    Reinterpretation of vitals: Unchanged    PE:   Constitutional: Well developed, Well nourished, No acute distress, Non-toxic appearance.   HENT: Normocephalic, Atraumatic, Bilateral external ears normal, Oropharynx is clear mucous membranes are moist. No oral exudates or nasal discharge.   Eyes: Pupils are equal round and reactive, EOMI, Conjunctiva normal, No discharge.   Neck: Normal range of motion, No tenderness, Supple, No stridor. No meningismus.  Lymphatic: No lymphadenopathy noted.   Cardiovascular: Regular rate and rhythm without murmur rub or gallop.  Thorax & Lungs: Clear breath sounds bilaterally without wheezes, rhonchi or rales. There is no chest wall tenderness.   Abdomen: Soft minimally tender in the left lower quadrant without rebound or guarding, non-distended. There is no rebound or guarding. No organomegaly is appreciated. Bowel sounds are normal.  Skin: Normal without rash.   Back: No CVA or spinal tenderness.   Extremities: Intact distal pulses, No edema, No tenderness, No cyanosis, No clubbing. Capillary refill is less than 2 seconds.  Musculoskeletal: Good range of motion in all major joints. No tenderness to palpation or major deformities noted.   Neurologic: Alert & oriented x 3, Normal motor function, Normal sensory function, No focal deficits noted. Reflexes are normal.  Psychiatric: Affect normal, Judgment normal, Mood normal. There is no suicidal ideation or patient " reported hallucinations.     LABS  Results for orders placed or performed during the hospital encounter of 08/14/22   CBC WITH DIFFERENTIAL   Result Value Ref Range    WBC 13.2 (H) 4.8 - 10.8 K/uL    RBC 5.37 4.70 - 6.10 M/uL    Hemoglobin 14.6 14.0 - 18.0 g/dL    Hematocrit 43.6 42.0 - 52.0 %    MCV 81.2 (L) 81.4 - 97.8 fL    MCH 27.2 27.0 - 33.0 pg    MCHC 33.5 (L) 33.7 - 35.3 g/dL    RDW 44.4 35.9 - 50.0 fL    Platelet Count 241 164 - 446 K/uL    MPV 10.8 9.0 - 12.9 fL    Neutrophils-Polys 59.90 44.00 - 72.00 %    Lymphocytes 31.00 22.00 - 41.00 %    Monocytes 7.30 0.00 - 13.40 %    Eosinophils 0.70 0.00 - 6.90 %    Basophils 0.60 0.00 - 1.80 %    Immature Granulocytes 0.50 0.00 - 0.90 %    Nucleated RBC 0.00 /100 WBC    Neutrophils (Absolute) 7.91 (H) 1.82 - 7.42 K/uL    Lymphs (Absolute) 4.09 1.00 - 4.80 K/uL    Monos (Absolute) 0.96 (H) 0.00 - 0.85 K/uL    Eos (Absolute) 0.09 0.00 - 0.51 K/uL    Baso (Absolute) 0.08 0.00 - 0.12 K/uL    Immature Granulocytes (abs) 0.07 0.00 - 0.11 K/uL    NRBC (Absolute) 0.00 K/uL   COMP METABOLIC PANEL   Result Value Ref Range    Sodium 138 135 - 145 mmol/L    Potassium 4.2 3.6 - 5.5 mmol/L    Chloride 104 96 - 112 mmol/L    Co2 21 20 - 33 mmol/L    Anion Gap 13.0 7.0 - 16.0    Glucose 96 65 - 99 mg/dL    Bun 21 8 - 22 mg/dL    Creatinine 1.00 0.50 - 1.40 mg/dL    Calcium 9.1 8.5 - 10.5 mg/dL    AST(SGOT) 28 12 - 45 U/L    ALT(SGPT) 36 2 - 50 U/L    Alkaline Phosphatase 74 30 - 99 U/L    Total Bilirubin 0.4 0.1 - 1.5 mg/dL    Albumin 4.4 3.2 - 4.9 g/dL    Total Protein 7.2 6.0 - 8.2 g/dL    Globulin 2.8 1.9 - 3.5 g/dL    A-G Ratio 1.6 g/dL   LIPASE   Result Value Ref Range    Lipase 27 11 - 82 U/L   URINALYSIS    Specimen: Urine, Clean Catch; Blood   Result Value Ref Range    Color Yellow     Character Clear     Specific Gravity 1.027 <1.035    Ph 5.5 5.0 - 8.0    Glucose Negative Negative mg/dL    Ketones Negative Negative mg/dL    Protein Negative Negative mg/dL    Bilirubin  Negative Negative    Urobilinogen, Urine 0.2 Negative    Nitrite Negative Negative    Leukocyte Esterase Negative Negative    Occult Blood Negative Negative    Micro Urine Req see below    ESTIMATED GFR   Result Value Ref Range    GFR (CKD-EPI) 101 >60 mL/min/1.73 m 2      All labs reviewed by me. Significant for slight leukocytosis of 13, no anemia, normal electrolytes, normal renal function, normal liver enzymes, normal bilirubin, lipase normal, urinalysis negative for blood, ketones or infection    COURSE & MEDICAL DECISION MAKING  Nursing notes, VS, PMSFHx, labs, imaging, EKG reviewed in chart.    MDM: 12:05 AM Floyd Avendaño is a 31 y.o. male who presented with intermittent left lower quadrant abdominal pain for the past 1 to 2 weeks.  Feels like a cramping sensation.  Denies diarrhea, constipation, dysuria, hematuria, no nausea or vomiting.  He has no associated symptoms, no fever.  His vital signs show mild hypertension but otherwise unremarkable.  Physical exam shows very minimal tenderness in the left lower quadrant without rebound or guarding and he is very well-appearing, ambulatory, tolerating oral intake.  Laboratory evaluation including CBC, CMP, lipase and urinalysis are negative other than a slight leukocytosis of 13.  Possible mild case of diverticulitis, although the patient is very young.  He is allergic to Augmentin so started him on levofloxacin for 10 days, 750 once a day, Tylenol, Motrin and have follow-up with PCP as needed.  Discussed strict return precautions with the patient he verbalized understanding plan and is amenable.  He has not want to stay for imaging referred to leave with an empiric diagnosis of diverticulitis and will return if symptoms worsen.  I discussed risk and benefits of this and he verbalized understanding and is amenable.    Patient has had high blood pressure while in the emergency department, felt likely secondary to medical condition. Counseled patient to monitor  blood pressure at home and follow up with primary care physician.      FINAL IMPRESSION  1. Diverticulitis Acute   2. Left lower quadrant abdominal pain Acute   3. Leukocytosis, unspecified type Acute         Jo PURVIS), am scribing for, and in the presence of, Geronimo Maravilla.    Electronically signed by: Jo Murdock (Jerry), 8/15/2022    IGeronimo personally performed the services described in this documentation, as scribed by Jo Murdock in my presence, and it is both accurate and complete.    The note accurately reflects work and decisions made by me.  Geronimo Maravilla  8/15/2022  12:35 AM

## 2023-11-08 ENCOUNTER — APPOINTMENT (OUTPATIENT)
Dept: RADIOLOGY | Facility: MEDICAL CENTER | Age: 33
End: 2023-11-08
Attending: EMERGENCY MEDICINE

## 2023-11-08 ENCOUNTER — HOSPITAL ENCOUNTER (EMERGENCY)
Facility: MEDICAL CENTER | Age: 33
End: 2023-11-08
Attending: EMERGENCY MEDICINE

## 2023-11-08 VITALS
TEMPERATURE: 97.8 F | DIASTOLIC BLOOD PRESSURE: 59 MMHG | HEART RATE: 56 BPM | RESPIRATION RATE: 16 BRPM | OXYGEN SATURATION: 95 % | SYSTOLIC BLOOD PRESSURE: 117 MMHG

## 2023-11-08 DIAGNOSIS — R10.32 LEFT LOWER QUADRANT ABDOMINAL PAIN: ICD-10-CM

## 2023-11-08 LAB
ALBUMIN SERPL BCP-MCNC: 4.5 G/DL (ref 3.2–4.9)
ALBUMIN/GLOB SERPL: 1.8 G/DL
ALP SERPL-CCNC: 61 U/L (ref 30–99)
ALT SERPL-CCNC: 30 U/L (ref 2–50)
ANION GAP SERPL CALC-SCNC: 9 MMOL/L (ref 7–16)
APPEARANCE UR: CLEAR
AST SERPL-CCNC: 23 U/L (ref 12–45)
BASOPHILS # BLD AUTO: 0.6 % (ref 0–1.8)
BASOPHILS # BLD: 0.05 K/UL (ref 0–0.12)
BILIRUB SERPL-MCNC: 0.3 MG/DL (ref 0.1–1.5)
BILIRUB UR QL STRIP.AUTO: NEGATIVE
BUN SERPL-MCNC: 16 MG/DL (ref 8–22)
CALCIUM ALBUM COR SERPL-MCNC: 8.9 MG/DL (ref 8.5–10.5)
CALCIUM SERPL-MCNC: 9.3 MG/DL (ref 8.4–10.2)
CHLORIDE SERPL-SCNC: 103 MMOL/L (ref 96–112)
CO2 SERPL-SCNC: 27 MMOL/L (ref 20–33)
COLOR UR: YELLOW
CREAT SERPL-MCNC: 0.88 MG/DL (ref 0.5–1.4)
EOSINOPHIL # BLD AUTO: 0.13 K/UL (ref 0–0.51)
EOSINOPHIL NFR BLD: 1.5 % (ref 0–6.9)
ERYTHROCYTE [DISTWIDTH] IN BLOOD BY AUTOMATED COUNT: 43.3 FL (ref 35.9–50)
GFR SERPLBLD CREATININE-BSD FMLA CKD-EPI: 115 ML/MIN/1.73 M 2
GLOBULIN SER CALC-MCNC: 2.5 G/DL (ref 1.9–3.5)
GLUCOSE SERPL-MCNC: 92 MG/DL (ref 65–99)
GLUCOSE UR STRIP.AUTO-MCNC: NEGATIVE MG/DL
HCT VFR BLD AUTO: 43.9 % (ref 42–52)
HGB BLD-MCNC: 14.4 G/DL (ref 14–18)
IMM GRANULOCYTES # BLD AUTO: 0.06 K/UL (ref 0–0.11)
IMM GRANULOCYTES NFR BLD AUTO: 0.7 % (ref 0–0.9)
KETONES UR STRIP.AUTO-MCNC: NEGATIVE MG/DL
LEUKOCYTE ESTERASE UR QL STRIP.AUTO: NEGATIVE
LIPASE SERPL-CCNC: 22 U/L (ref 11–82)
LYMPHOCYTES # BLD AUTO: 2.67 K/UL (ref 1–4.8)
LYMPHOCYTES NFR BLD: 30.1 % (ref 22–41)
MCH RBC QN AUTO: 27.5 PG (ref 27–33)
MCHC RBC AUTO-ENTMCNC: 32.8 G/DL (ref 32.3–36.5)
MCV RBC AUTO: 83.8 FL (ref 81.4–97.8)
MICRO URNS: NORMAL
MONOCYTES # BLD AUTO: 0.61 K/UL (ref 0–0.85)
MONOCYTES NFR BLD AUTO: 6.9 % (ref 0–13.4)
NEUTROPHILS # BLD AUTO: 5.34 K/UL (ref 1.82–7.42)
NEUTROPHILS NFR BLD: 60.2 % (ref 44–72)
NITRITE UR QL STRIP.AUTO: NEGATIVE
NRBC # BLD AUTO: 0 K/UL
NRBC BLD-RTO: 0 /100 WBC (ref 0–0.2)
PH UR STRIP.AUTO: 5.5 [PH] (ref 5–8)
PLATELET # BLD AUTO: 221 K/UL (ref 164–446)
PMV BLD AUTO: 11.2 FL (ref 9–12.9)
POTASSIUM SERPL-SCNC: 4 MMOL/L (ref 3.6–5.5)
PROT SERPL-MCNC: 7 G/DL (ref 6–8.2)
PROT UR QL STRIP: NEGATIVE MG/DL
RBC # BLD AUTO: 5.24 M/UL (ref 4.7–6.1)
RBC UR QL AUTO: NEGATIVE
SODIUM SERPL-SCNC: 139 MMOL/L (ref 135–145)
SP GR UR STRIP.AUTO: >=1.03
WBC # BLD AUTO: 8.9 K/UL (ref 4.8–10.8)

## 2023-11-08 PROCEDURE — 74177 CT ABD & PELVIS W/CONTRAST: CPT

## 2023-11-08 PROCEDURE — 36415 COLL VENOUS BLD VENIPUNCTURE: CPT

## 2023-11-08 PROCEDURE — 94760 N-INVAS EAR/PLS OXIMETRY 1: CPT

## 2023-11-08 PROCEDURE — 83690 ASSAY OF LIPASE: CPT

## 2023-11-08 PROCEDURE — 85025 COMPLETE CBC W/AUTO DIFF WBC: CPT

## 2023-11-08 PROCEDURE — 81003 URINALYSIS AUTO W/O SCOPE: CPT

## 2023-11-08 PROCEDURE — 80053 COMPREHEN METABOLIC PANEL: CPT

## 2023-11-08 PROCEDURE — 99284 EMERGENCY DEPT VISIT MOD MDM: CPT

## 2023-11-08 PROCEDURE — 700117 HCHG RX CONTRAST REV CODE 255: Performed by: EMERGENCY MEDICINE

## 2023-11-08 RX ADMIN — IOHEXOL 100 ML: 350 INJECTION, SOLUTION INTRAVENOUS at 20:48

## 2023-11-08 ASSESSMENT — PAIN DESCRIPTION - PAIN TYPE: TYPE: ACUTE PAIN

## 2023-11-09 NOTE — ED NOTES
Pt reports they want to leave AMA. Education given that a physician could meet with them and the pt refused and asked if there was anything we could do if they just left. IV removed and pt dressed themselves and left. ERP notified and attempted to speak with the pt however the pt left before the ERP could speak with them.

## 2023-11-09 NOTE — ED NOTES
Pt reports intermittent pain for the last year. 1/10 in the lower left with some pain in the lower right abdomen. Pt denies, N/V/D. Pt is alert and oriented and reports anxiety at this time and states they are afraid to die. Pt reassured of stable vitals and appearance at this time and we will update them when we get test results back.

## 2023-11-09 NOTE — DISCHARGE INSTRUCTIONS
Lab tests are normal, your CT is negative for significant disease.  At this time you are discharged home, use Motrin Tylenol for discomfort and referral has been placed for gastroenterology for follow-up

## 2023-11-09 NOTE — ED TRIAGE NOTES
"Chief Complaint   Patient presents with    Abdominal Pain     Present for \"awhile\"; lower abd pain that pt describes as \"twisting and cramping\"; denies and n/v/d or issues w/ urination      BP (!) 142/90   Pulse 66   Resp 16   SpO2 95%     Pt arrived w/ above concern   "

## 2023-11-09 NOTE — ED PROVIDER NOTES
"ER Provider Note    Scribed for Von Thomason D.O. by Rossy Saavedra. 11/8/2023  7:08 PM    Primary Care Provider: Pcp Pt States None    CHIEF COMPLAINT  Chief Complaint   Patient presents with    Abdominal Pain     Present for \"awhile\"; lower abd pain that pt describes as \"twisting and cramping\"; denies and n/v/d or issues w/ urination        HPI/ROS    Floyd Avendaño is a 33 y.o. male who presents to the Emergency Department for abdominal pain onset one year ago. The patient describes that one year ago his right lower abdomen was painful, but was told there was no immediate concern, it was most likely a virus, and should go away. However he notes that since then the pain is intermittent, and when it appears it is a severe \"twisting and cramping\". He notes right now it is not severe. He denies any nausea, vomiting, or diarrhea. He denies urinary problems. He denies seeing his primary care provider about this. There are no known alleviating or exacerbating factors.      ROS as per HPI.    PAST MEDICAL HISTORY  Past Medical History:   Diagnosis Date    Anxiety        SURGICAL HISTORY  Past Surgical History:   Procedure Laterality Date    LIPOSUCTION         FAMILY HISTORY  History reviewed. No pertinent family history.    SOCIAL HISTORY   reports that he has been smoking cigarettes. He has never used smokeless tobacco. He reports current alcohol use. He reports that he does not currently use drugs after having used the following drugs: Inhaled.    CURRENT MEDICATIONS  Previous Medications    CYCLOBENZAPRINE (FLEXERIL) 5 MG TABLET    Take 1-2 Tabs by mouth 3 times a day as needed.    LEVOFLOXACIN (LEVAQUIN) 750 MG TABLET    Take 1 Tablet by mouth every day.       ALLERGIES  Pcn [penicillins]    PHYSICAL EXAM  BP (!) 142/90   Pulse 66   Temp 36.1 °C (97 °F) (Temporal)   Resp 16   SpO2 95%     General: No acute distress. BMI is greater than 30.  HENT: Normocephalic, Mucus membranes are moist.   Chest: Lungs have " even and unlabored respirations, Clear to auscultation.   Cardiovascular: Regular rate and regular rhythm, No peripheral cyanosis.  Abdomen: Non distended. Mild LLQ tenderness.  Neuro: Awake, Conversive, Able to relay recent events.  Psychiatric: Calm and cooperative.     EXTERNAL RECORDS REVIEWED  Review of patient's past medical records show last year visit for abdominal pain similar to this.     INITIAL ASSESSMENT  Presents with abdominal pain that is intermittent starting several months ago. Now starting right lower quadrant pain. There is concern for diverticulitis, cholecystitis present. Will evaluate with lab and CT.    ED Observation Status? Yes; I am placing the patient in to an observation status due to a diagnostic uncertainty as well as therapeutic intensity. Patient placed in observation status at 7:14 PM, 11/8/2023.     Observation plan is as follows: Will evaluate with lab and CT.    Upon Reevaluation, the patient's condition has: Improved; and will be discharged.    Patient discharged from ED Observation status at 8:13 PM 11/8/2023     DIAGNOSTIC STUDIES    Labs:   Results for orders placed or performed during the hospital encounter of 11/08/23   CBC WITH DIFFERENTIAL   Result Value Ref Range    WBC 8.9 4.8 - 10.8 K/uL    RBC 5.24 4.70 - 6.10 M/uL    Hemoglobin 14.4 14.0 - 18.0 g/dL    Hematocrit 43.9 42.0 - 52.0 %    MCV 83.8 81.4 - 97.8 fL    MCH 27.5 27.0 - 33.0 pg    MCHC 32.8 32.3 - 36.5 g/dL    RDW 43.3 35.9 - 50.0 fL    Platelet Count 221 164 - 446 K/uL    MPV 11.2 9.0 - 12.9 fL    Neutrophils-Polys 60.20 44.00 - 72.00 %    Lymphocytes 30.10 22.00 - 41.00 %    Monocytes 6.90 0.00 - 13.40 %    Eosinophils 1.50 0.00 - 6.90 %    Basophils 0.60 0.00 - 1.80 %    Immature Granulocytes 0.70 0.00 - 0.90 %    Nucleated RBC 0.00 0.00 - 0.20 /100 WBC    Neutrophils (Absolute) 5.34 1.82 - 7.42 K/uL    Lymphs (Absolute) 2.67 1.00 - 4.80 K/uL    Monos (Absolute) 0.61 0.00 - 0.85 K/uL    Eos (Absolute) 0.13  0.00 - 0.51 K/uL    Baso (Absolute) 0.05 0.00 - 0.12 K/uL    Immature Granulocytes (abs) 0.06 0.00 - 0.11 K/uL    NRBC (Absolute) 0.00 K/uL   COMP METABOLIC PANEL   Result Value Ref Range    Sodium 139 135 - 145 mmol/L    Potassium 4.0 3.6 - 5.5 mmol/L    Chloride 103 96 - 112 mmol/L    Co2 27 20 - 33 mmol/L    Anion Gap 9.0 7.0 - 16.0    Glucose 92 65 - 99 mg/dL    Bun 16 8 - 22 mg/dL    Creatinine 0.88 0.50 - 1.40 mg/dL    Calcium 9.3 8.4 - 10.2 mg/dL    Correct Calcium 8.9 8.5 - 10.5 mg/dL    AST(SGOT) 23 12 - 45 U/L    ALT(SGPT) 30 2 - 50 U/L    Alkaline Phosphatase 61 30 - 99 U/L    Total Bilirubin 0.3 0.1 - 1.5 mg/dL    Albumin 4.5 3.2 - 4.9 g/dL    Total Protein 7.0 6.0 - 8.2 g/dL    Globulin 2.5 1.9 - 3.5 g/dL    A-G Ratio 1.8 g/dL   LIPASE   Result Value Ref Range    Lipase 22 11 - 82 U/L   URINALYSIS (UA)    Specimen: Urine   Result Value Ref Range    Color Yellow     Character Clear     Specific Gravity >=1.030 <1.035    Ph 5.5 5.0 - 8.0    Glucose Negative Negative mg/dL    Ketones Negative Negative mg/dL    Protein Negative Negative mg/dL    Bilirubin Negative Negative    Nitrite Negative Negative    Leukocyte Esterase Negative Negative    Occult Blood Negative Negative    Micro Urine Req see below    ESTIMATED GFR   Result Value Ref Range    GFR (CKD-EPI) 115 >60 mL/min/1.73 m 2      Radiology:   The attending emergency physician has independently interpreted the diagnostic imaging associated with this visit and am waiting the final reading from the radiologist.   Preliminary interpretation is as follows: No acute disease  Radiologist interpretation:   CT-ABDOMEN-PELVIS WITH    (Results Pending)      COURSE & MEDICAL DECISION MAKING     COURSE AND PLAN  7:14 PM - Patient was evaluated at bedside; Patient is a 33 y.o. male who presents for evaluation of right lower abdomen pain. Denies vomiting, nausea, diarrhea. Exam reveals Mild LLQ tenderness. BMI greater than 30. Discussed with patient and/or family  that labs and imaging will be ordered to further evaluate. CT-abdomen-pelvis, CBC w/ diff, CMP, Lipase, UA ordered. Patient and/or family agrees to the plan of care.    10:23 PM- I discussed the patient's diagnostic study resu. Noted the patient's exam and vitals at this time are reassuring. Discussed plan for discharge; I advised the patient to return to the [facility] ED with any new or worsening symptoms. Patient was given the opportunity to ask any questions. I addressed all questions or concerns and the patient is stable for discharge at this time. Patient verbalizes understanding and agreement to this plan of care.           ED Summary: Patient presented with left lower quadrant abdominal pain now it is bilateral lower.  He has been ongoing intermittently for several months.  His white blood cell count is normal his abdominal exam is mildly tender.  CT is pending.  This time I discussed with the patient if the CT is negative he is stable for discharge home, after CT is read then further disposition will be made.    DISPOSITION AND DISCUSSIONS  The patient will return for new or worsening symptoms and is stable at the time of discharge.    The patient is referred to a primary physician for blood pressure management, diabetic screening, and for all other preventative health concerns.    DISPOSITION:  Patient will be discharged home in stable condition.    FOLLOW UP:  [facility]  [id number] [facility] [id number] [phone]  In 1 week        OUTPATIENT MEDICATIONS:  New Prescriptions    No medications on file      FINAL DIAGNOSIS  1. Left lower quadrant abdominal pain        Rossy PURVIS (Jerry), am scribing for, and in the presence of, Von Thomason D.O..    Electronically signed by: Rossy Saavedra (Jerry), 11/8/2023    Von PURVIS D.O. personally performed the services described in this documentation, as scribed by Rossy Saavedra in my presence, and it is both accurate and  complete.     The note accurately reflects work and decisions made by me.  Von Thomason D.O.  11/8/2023  10:24 PM

## 2023-11-14 ENCOUNTER — TELEPHONE (OUTPATIENT)
Dept: HEALTH INFORMATION MANAGEMENT | Facility: OTHER | Age: 33
End: 2023-11-14
Payer: OTHER MISCELLANEOUS

## 2024-01-13 ENCOUNTER — APPOINTMENT (OUTPATIENT)
Dept: RADIOLOGY | Facility: MEDICAL CENTER | Age: 34
End: 2024-01-13
Attending: STUDENT IN AN ORGANIZED HEALTH CARE EDUCATION/TRAINING PROGRAM

## 2024-01-13 ENCOUNTER — HOSPITAL ENCOUNTER (EMERGENCY)
Facility: MEDICAL CENTER | Age: 34
End: 2024-01-14
Attending: STUDENT IN AN ORGANIZED HEALTH CARE EDUCATION/TRAINING PROGRAM

## 2024-01-13 DIAGNOSIS — R10.13 EPIGASTRIC PAIN: ICD-10-CM

## 2024-01-13 DIAGNOSIS — K80.20 GALL STONES: ICD-10-CM

## 2024-01-13 DIAGNOSIS — R74.8 ELEVATED LIVER ENZYMES: ICD-10-CM

## 2024-01-13 LAB
ALBUMIN SERPL BCP-MCNC: 4.1 G/DL (ref 3.2–4.9)
ALBUMIN/GLOB SERPL: 1.8 G/DL
ALP SERPL-CCNC: 91 U/L (ref 30–99)
ALT SERPL-CCNC: 123 U/L (ref 2–50)
ANION GAP SERPL CALC-SCNC: 11 MMOL/L (ref 7–16)
APPEARANCE UR: CLEAR
AST SERPL-CCNC: 69 U/L (ref 12–45)
BACTERIA #/AREA URNS HPF: NEGATIVE /HPF
BASOPHILS # BLD AUTO: 0.6 % (ref 0–1.8)
BASOPHILS # BLD: 0.06 K/UL (ref 0–0.12)
BILIRUB SERPL-MCNC: 0.2 MG/DL (ref 0.1–1.5)
BILIRUB UR QL STRIP.AUTO: NEGATIVE
BUN SERPL-MCNC: 19 MG/DL (ref 8–22)
CALCIUM ALBUM COR SERPL-MCNC: 8.9 MG/DL (ref 8.5–10.5)
CALCIUM SERPL-MCNC: 9 MG/DL (ref 8.5–10.5)
CHLORIDE SERPL-SCNC: 105 MMOL/L (ref 96–112)
CO2 SERPL-SCNC: 23 MMOL/L (ref 20–33)
COLOR UR: YELLOW
CREAT SERPL-MCNC: 0.88 MG/DL (ref 0.5–1.4)
EOSINOPHIL # BLD AUTO: 0.22 K/UL (ref 0–0.51)
EOSINOPHIL NFR BLD: 2.1 % (ref 0–6.9)
EPI CELLS #/AREA URNS HPF: NEGATIVE /HPF
ERYTHROCYTE [DISTWIDTH] IN BLOOD BY AUTOMATED COUNT: 44.2 FL (ref 35.9–50)
GFR SERPLBLD CREATININE-BSD FMLA CKD-EPI: 115 ML/MIN/1.73 M 2
GLOBULIN SER CALC-MCNC: 2.3 G/DL (ref 1.9–3.5)
GLUCOSE SERPL-MCNC: 111 MG/DL (ref 65–99)
GLUCOSE UR STRIP.AUTO-MCNC: NEGATIVE MG/DL
HCT VFR BLD AUTO: 43.2 % (ref 42–52)
HGB BLD-MCNC: 14.5 G/DL (ref 14–18)
HYALINE CASTS #/AREA URNS LPF: ABNORMAL /LPF
IMM GRANULOCYTES # BLD AUTO: 0.04 K/UL (ref 0–0.11)
IMM GRANULOCYTES NFR BLD AUTO: 0.4 % (ref 0–0.9)
KETONES UR STRIP.AUTO-MCNC: NEGATIVE MG/DL
LEUKOCYTE ESTERASE UR QL STRIP.AUTO: ABNORMAL
LIPASE SERPL-CCNC: 28 U/L (ref 11–82)
LYMPHOCYTES # BLD AUTO: 3.3 K/UL (ref 1–4.8)
LYMPHOCYTES NFR BLD: 31.3 % (ref 22–41)
MCH RBC QN AUTO: 27.9 PG (ref 27–33)
MCHC RBC AUTO-ENTMCNC: 33.6 G/DL (ref 32.3–36.5)
MCV RBC AUTO: 83.2 FL (ref 81.4–97.8)
MICRO URNS: ABNORMAL
MONOCYTES # BLD AUTO: 0.75 K/UL (ref 0–0.85)
MONOCYTES NFR BLD AUTO: 7.1 % (ref 0–13.4)
NEUTROPHILS # BLD AUTO: 6.18 K/UL (ref 1.82–7.42)
NEUTROPHILS NFR BLD: 58.5 % (ref 44–72)
NITRITE UR QL STRIP.AUTO: NEGATIVE
NRBC # BLD AUTO: 0 K/UL
NRBC BLD-RTO: 0 /100 WBC (ref 0–0.2)
PH UR STRIP.AUTO: 6.5 [PH] (ref 5–8)
PLATELET # BLD AUTO: 222 K/UL (ref 164–446)
PMV BLD AUTO: 11.2 FL (ref 9–12.9)
POTASSIUM SERPL-SCNC: 3.9 MMOL/L (ref 3.6–5.5)
PROT SERPL-MCNC: 6.4 G/DL (ref 6–8.2)
PROT UR QL STRIP: NEGATIVE MG/DL
RBC # BLD AUTO: 5.19 M/UL (ref 4.7–6.1)
RBC # URNS HPF: ABNORMAL /HPF
RBC UR QL AUTO: NEGATIVE
SODIUM SERPL-SCNC: 139 MMOL/L (ref 135–145)
SP GR UR STRIP.AUTO: 1.02
UROBILINOGEN UR STRIP.AUTO-MCNC: 0.2 MG/DL
WBC # BLD AUTO: 10.6 K/UL (ref 4.8–10.8)
WBC #/AREA URNS HPF: ABNORMAL /HPF

## 2024-01-13 PROCEDURE — 85025 COMPLETE CBC W/AUTO DIFF WBC: CPT

## 2024-01-13 PROCEDURE — 36415 COLL VENOUS BLD VENIPUNCTURE: CPT

## 2024-01-13 PROCEDURE — A9270 NON-COVERED ITEM OR SERVICE: HCPCS | Performed by: STUDENT IN AN ORGANIZED HEALTH CARE EDUCATION/TRAINING PROGRAM

## 2024-01-13 PROCEDURE — 700102 HCHG RX REV CODE 250 W/ 637 OVERRIDE(OP): Performed by: STUDENT IN AN ORGANIZED HEALTH CARE EDUCATION/TRAINING PROGRAM

## 2024-01-13 PROCEDURE — 80053 COMPREHEN METABOLIC PANEL: CPT

## 2024-01-13 PROCEDURE — 87086 URINE CULTURE/COLONY COUNT: CPT

## 2024-01-13 PROCEDURE — 99284 EMERGENCY DEPT VISIT MOD MDM: CPT

## 2024-01-13 PROCEDURE — 81001 URINALYSIS AUTO W/SCOPE: CPT

## 2024-01-13 PROCEDURE — 83690 ASSAY OF LIPASE: CPT

## 2024-01-13 RX ORDER — DICYCLOMINE HCL 20 MG
20 TABLET ORAL ONCE
Status: COMPLETED | OUTPATIENT
Start: 2024-01-13 | End: 2024-01-13

## 2024-01-13 RX ADMIN — DICYCLOMINE HYDROCHLORIDE 20 MG: 20 TABLET ORAL at 23:19

## 2024-01-13 ASSESSMENT — PAIN DESCRIPTION - PAIN TYPE
TYPE: ACUTE PAIN
TYPE: ACUTE PAIN

## 2024-01-13 ASSESSMENT — FIBROSIS 4 INDEX: FIB4 SCORE: 0.67

## 2024-01-14 VITALS
HEIGHT: 68 IN | BODY MASS INDEX: 43.6 KG/M2 | WEIGHT: 287.7 LBS | OXYGEN SATURATION: 93 % | DIASTOLIC BLOOD PRESSURE: 57 MMHG | TEMPERATURE: 98.3 F | RESPIRATION RATE: 18 BRPM | HEART RATE: 55 BPM | SYSTOLIC BLOOD PRESSURE: 107 MMHG

## 2024-01-14 PROCEDURE — 76705 ECHO EXAM OF ABDOMEN: CPT

## 2024-01-14 NOTE — ED NOTES
Pt ambulated to [facility] [address] from the lobby with a steady gait. Pt changed into a gown and placed on the monitor. Agree with triage note. Chart up for ERP.

## 2024-01-14 NOTE — ED TRIAGE NOTES
"Chief Complaint   Patient presents with    Abdominal Pain     LLQ cramping x1 year. Patient states \"it comes and goes\".  Pt left AMA on 11/8 and states he did not follow up with GI. 5/10 pain. Last BM today. -n/v      Patient ambulatory to triage for above complaint. Patient A&Ox4, GCS 15, patient speaking in full sentences. Equal and unlabored respirations. Patient educated on triage process and encouraged to notify staff if condition worsens. Appropriate protocols ordered. Patient returned to the lobby in stable condition.     "

## 2024-01-14 NOTE — ED NOTES
Pt understands DC instructions and follow-up w/surgery, IV removed, pt ambulated with steady gait to [facility] cat for DC

## 2024-01-14 NOTE — ED NOTES
Pt medicated per MAR. Pt resting quietly on gurney, NAD, VSS, no indicators of pain.  Call light within reach.

## 2024-01-14 NOTE — ED PROVIDER NOTES
"CHIEF COMPLAINT  Chief Complaint   Patient presents with    Abdominal Pain     LLQ cramping x1 year. Patient states \"it comes and goes\".  Pt left AMA on 11/8 and states he did not follow up with GI. 5/10 pain. Last BM today. -n/v       LIMITATION TO HISTORY   Select: None    HPI    Floyd Avendaño is a 33 y.o. male who presents to the Emergency Department evaluation of abdominal pain.  Patient states that for the past year he has been having intermittent mitten episodes of left lower quadrant abdominal \"fullness\".  He states he will feel like there is a bulge in his left lower quadrant, and then will then resolve he has no associated pain with this.  Does state that it comes and goes care and does not associate with eating or drinking nausea vomiting or diarrhea.  Also states that intermittently over the past several months has been having right upper right lower and epigastric abdominal pain that is rated a 5 out of 10.  Does state it seems to be worse when he eats fatty foods it would last for several minutes and then resolved.  Last episode was after eating a hot dog a few days ago.  Again not associate with nausea vomiting diarrhea fevers, he is passing gas having regular bowel movements, denies any constipation.  He is tolerating p.o. well at home.  No recent fevers.    OUTSIDE HISTORIAN(S):  Select: None    EXTERNAL RECORDS REVIEWED  Select: Other reviewed a CT scan from November there was a fat-containing left inguinal hernia as well as cholelithiasis, as well as hepatomegaly      PAST MEDICAL HISTORY  Past Medical History:   Diagnosis Date    Anxiety      .    SURGICAL HISTORY  Past Surgical History:   Procedure Laterality Date    LIPOSUCTION           FAMILY HISTORY  History reviewed. No pertinent family history.       SOCIAL HISTORY  Social History     Socioeconomic History    Marital status: Single     Spouse name: Not on file    Number of children: Not on file    Years of education: Not on file    " "Highest education level: Not on file   Occupational History    Not on file   Tobacco Use    Smoking status: Some Days     Types: Cigarettes    Smokeless tobacco: Never   Vaping Use    Vaping Use: Never used   Substance and Sexual Activity    Alcohol use: Yes     Comment: occasssioanlly    Drug use: Not Currently     Types: Inhaled     Comment: marijuana- daily.  Former crystal meth user - Quit 2014    Sexual activity: Not on file   Other Topics Concern    Not on file   Social History Narrative    ** Merged History Encounter **          Social Determinants of Health     Financial Resource Strain: Not on file   Food Insecurity: Not on file   Transportation Needs: Not on file   Physical Activity: Not on file   Stress: Not on file   Social Connections: Not on file   Intimate Partner Violence: Not on file   Housing Stability: Not on file         CURRENT MEDICATIONS  No current facility-administered medications on file prior to encounter.     Current Outpatient Medications on File Prior to Encounter   Medication Sig Dispense Refill    levoFLOXacin (LEVAQUIN) 750 MG tablet Take 1 Tablet by mouth every day. 10 Tablet 0    cyclobenzaprine (FLEXERIL) 5 MG tablet Take 1-2 Tabs by mouth 3 times a day as needed. 30 Tab 0           ALLERGIES  Allergies   Allergen Reactions    Pcn [Penicillins]      Taken recently with no reaction       PHYSICAL EXAM  VITAL SIGNS:BP 94/55   Pulse 62   Temp 36.1 °C (97 °F) (Temporal)   Resp 16   Ht 1.727 m (5' 8\")   Wt (!) 130 kg (287 lb 11.2 oz)   SpO2 93%   BMI 43.74 kg/m²       VITALS - vital signs documented prior to this note have been reviewed and noted,  GENERAL - awake, alert, oriented, GCS 15, no apparent distress, non-toxic  appearing  HEENT - normocephalic, atraumatic, pupils equal, sclera anicteric, mucus  membranes moist  NECK - supple, no meningismus, full active range of motion, trachea midline  CARDIOVASCULAR - regular rate/rhythm, no murmurs/gallops/rubs  PULMONARY - no " respiratory distress, speaking in full sentences, clear to  auscultation bilaterally, no wheezing/ronchi/rales, no accessory muscle use  GASTROINTESTINAL bowel sounds present normal active negative Oliveros sign no McBurney's point tenderness no rebound guarding or peritonitis no CVA tenderness no palpable abdominal masses no overlying abrasions lesions or contusions  GENITOURINARY - Deferred  NEUROLOGIC - Awake alert, normal mental status, speech fluid, cognition  normal, moves all extremities  MUSCULOSKELETAL - no obvious asymmetry or deformities present  EXTREMITIES - warm, well-perfused, no cyanosis or significant edema  DERMATOLOGIC - warm, dry, no rashes, no jaundice  PSYCHIATRIC - normal affect, normal insight, normal concentration    DIAGNOSTIC STUDIES / PROCEDURES    LABS  Labs Reviewed   COMP METABOLIC PANEL - Abnormal; Notable for the following components:       Result Value    Glucose 111 (*)     AST(SGOT) 69 (*)     ALT(SGPT) 123 (*)     All other components within normal limits   URINALYSIS - Abnormal; Notable for the following components:    Leukocyte Esterase Trace (*)     All other components within normal limits   URINE MICROSCOPIC (W/UA) - Abnormal; Notable for the following components:    WBC 10-20 (*)     RBC 0-2 (*)     All other components within normal limits   CBC WITH DIFFERENTIAL   LIPASE   ESTIMATED GFR   URINE CULTURE(NEW)    Narrative:     Indication for culture:->Patient WITHOUT an indwelling Coker                  catheter in place with new onset of Dysuria, Frequency,                  Urgency, and/or Suprapubic pain       CBC shows no significant leukocytosis  RADIOLOGY  I have independently interpreted the diagnostic imaging associated with this visit and am waiting the final reading from the radiologist.   My preliminary interpretation is as follows: Gallstones without evidence of cholecystitis      Radiologist interpretation:   US-RUQ   Final Result      1.  Hepatic steatosis and  hepatomegaly.   2.  Cholelithiasis without sonographic evidence of acute cholecystitis.           COURSE & MEDICAL DECISION MAKING    ED COURSE:    ED Observation Status? no    INTERVENTIONS BY ME:  Medications   dicyclomine (Bentyl) tablet 20 mg (20 mg Oral Given 1/13/24 4591)             INITIAL ASSESSMENT, COURSE AND PLAN  Care Narrative: Patient presented for evaluation of a left lower quadrant abdominal pain as well as intermittent episodes of right upper quadrant abdominal pain.  On examination he has no current abdominal pain did review a recent CT scan which she had obtained in November which did show cholelithiasis as well as a left inguinal hernia.  No appreciable hernias on examination today did repeat labs which showed a new elevation in his liver enzymes given the intermittent episodes of right upper quadrant abdominal pain was concern for possible biliary etiology of his pain thus a right upper quadrant ultrasound was ordered.  Ultrasound was remarkable for cholelithiasis without evidence of cholecystitis his blood work is otherwise reassuring and his pain is resolved lowering concern for underlying cholecystitis ascending cholangitis with choledocholithiasis.  History and physical exam seems consistent with biliary colic.  Though given that pain is resolved do believe he is appropriate for outpatient management, return precautions were discussed at length to the referral to general surgery as well as a PCP replaced and was discharged in stable condition             ADDITIONAL PROBLEM LIST  Discussed the patient's elevated liver enzymes I recommended PCP follow-up he does not have a primary care physician thus a referral was placed.  DISPOSITION AND DISCUSSIONS      Barriers to care at this time, including but not limited to: Patient does not have established PCP.     Decision tools and prescription drugs considered including, but not limited to: Pain Medications discussed with the patient the use of  oral opioids though after a discussion they did feel comfortable using over-the-counter Tylenol and ibuprofen thus narcotics will be deferred .    FINAL DIAGNOSIS  1. Epigastric pain    2. Gall stones    3. Elevated liver enzymes             Electronically signed by: Francisco Rivera DO ,12:50 AM 01/13/24

## 2024-01-14 NOTE — ED NOTES
Pt transferred from [facility] [address] to [facility]; pt ambulated with steady gait and all belongings on person, VSS, NAD, no indicators of pain.  Pt placed on gurney and on monitoring, provided with blankets. Pt updated on POC, aware that he is to remain NPO until all results are back and ERP gives permission.    ERP updated on pt's room assignment and status.    Report given to Katelynn GASCA RN.

## 2024-01-15 LAB
BACTERIA UR CULT: NORMAL
SIGNIFICANT IND 70042: NORMAL
SITE SITE: NORMAL
SOURCE SOURCE: NORMAL

## 2024-02-13 ENCOUNTER — TELEPHONE (OUTPATIENT)
Dept: HEALTH INFORMATION MANAGEMENT | Facility: OTHER | Age: 34
End: 2024-02-13
Payer: OTHER MISCELLANEOUS